# Patient Record
Sex: MALE | Race: WHITE | Employment: OTHER | URBAN - METROPOLITAN AREA
[De-identification: names, ages, dates, MRNs, and addresses within clinical notes are randomized per-mention and may not be internally consistent; named-entity substitution may affect disease eponyms.]

---

## 2017-01-01 ENCOUNTER — APPOINTMENT (OUTPATIENT)
Dept: GENERAL RADIOLOGY | Facility: HOSPITAL | Age: 72
DRG: 266 | End: 2017-01-01
Attending: NURSE PRACTITIONER
Payer: MEDICARE

## 2017-01-01 ENCOUNTER — HOSPITAL ENCOUNTER (OUTPATIENT)
Dept: GENERAL RADIOLOGY | Facility: HOSPITAL | Age: 72
Discharge: HOME | End: 2017-01-01
Attending: NURSE PRACTITIONER
Payer: MEDICARE

## 2017-01-01 ENCOUNTER — HOSPITAL ENCOUNTER (INPATIENT)
Facility: HOSPITAL | Age: 72
LOS: 2 days | Discharge: HOME | DRG: 266 | End: 2017-01-01
Attending: INTERNAL MEDICINE | Admitting: INTERNAL MEDICINE
Payer: MEDICARE

## 2017-01-01 ENCOUNTER — HOSPITAL ENCOUNTER (OUTPATIENT)
Dept: CT IMAGING | Facility: HOSPITAL | Age: 72
Discharge: HOME | End: 2017-01-01
Attending: PHYSICIAN ASSISTANT
Payer: MEDICARE

## 2017-01-01 ENCOUNTER — APPOINTMENT (OUTPATIENT)
Dept: CV DIAGNOSTICS | Facility: HOSPITAL | Age: 72
DRG: 266 | End: 2017-01-01
Attending: NURSE PRACTITIONER
Payer: MEDICARE

## 2017-01-01 ENCOUNTER — HOSPITAL ENCOUNTER (OUTPATIENT)
Dept: ULTRASOUND IMAGING | Facility: HOSPITAL | Age: 72
Discharge: HOME | End: 2017-01-01
Attending: PHYSICIAN ASSISTANT
Payer: MEDICARE

## 2017-01-01 ENCOUNTER — ANESTHESIA EVENT (OUTPATIENT)
Dept: CARDIAC SURGERY | Facility: HOSPITAL | Age: 72
End: 2017-01-01

## 2017-01-01 ENCOUNTER — SURGERY (OUTPATIENT)
Age: 72
End: 2017-01-01

## 2017-01-01 ENCOUNTER — ANESTHESIA (OUTPATIENT)
Dept: CARDIAC SURGERY | Facility: HOSPITAL | Age: 72
End: 2017-01-01

## 2017-01-01 ENCOUNTER — RT VISIT (OUTPATIENT)
Dept: RESPIRATORY THERAPY | Facility: HOSPITAL | Age: 72
End: 2017-01-01
Attending: PHYSICIAN ASSISTANT
Payer: MEDICARE

## 2017-01-01 VITALS
TEMPERATURE: 98 F | RESPIRATION RATE: 24 BRPM | BODY MASS INDEX: 33 KG/M2 | OXYGEN SATURATION: 97 % | HEART RATE: 88 BPM | SYSTOLIC BLOOD PRESSURE: 122 MMHG | WEIGHT: 255.75 LBS | DIASTOLIC BLOOD PRESSURE: 101 MMHG

## 2017-01-01 DIAGNOSIS — Z01.810 PRE-OPERATIVE CARDIOVASCULAR EXAMINATION: ICD-10-CM

## 2017-01-01 DIAGNOSIS — I35.0 SEVERE AORTIC STENOSIS: ICD-10-CM

## 2017-01-01 DIAGNOSIS — Z95.2 H/O AORTIC VALVE REPLACEMENT: ICD-10-CM

## 2017-01-01 PROCEDURE — 76937 US GUIDE VASCULAR ACCESS: CPT | Performed by: NURSE PRACTITIONER

## 2017-01-01 PROCEDURE — 71010 XR CHEST AP PORTABLE  (CPT=71010): CPT

## 2017-01-01 PROCEDURE — B24BZZ4 ULTRASONOGRAPHY OF HEART WITH AORTA, TRANSESOPHAGEAL: ICD-10-PCS | Performed by: INTERNAL MEDICINE

## 2017-01-01 PROCEDURE — 36569 INSJ PICC 5 YR+ W/O IMAGING: CPT | Performed by: NURSE PRACTITIONER

## 2017-01-01 PROCEDURE — 85610 PROTHROMBIN TIME: CPT | Performed by: NURSE PRACTITIONER

## 2017-01-01 PROCEDURE — 85025 COMPLETE CBC W/AUTO DIFF WBC: CPT | Performed by: NURSE PRACTITIONER

## 2017-01-01 PROCEDURE — 87081 CULTURE SCREEN ONLY: CPT | Performed by: INTERNAL MEDICINE

## 2017-01-01 PROCEDURE — 81003 URINALYSIS AUTO W/O SCOPE: CPT | Performed by: NURSE PRACTITIONER

## 2017-01-01 PROCEDURE — 97530 THERAPEUTIC ACTIVITIES: CPT

## 2017-01-01 PROCEDURE — 93880 EXTRACRANIAL BILAT STUDY: CPT

## 2017-01-01 PROCEDURE — 93325 DOPPLER ECHO COLOR FLOW MAPG: CPT

## 2017-01-01 PROCEDURE — 83735 ASSAY OF MAGNESIUM: CPT | Performed by: NURSE PRACTITIONER

## 2017-01-01 PROCEDURE — 80061 LIPID PANEL: CPT | Performed by: NURSE PRACTITIONER

## 2017-01-01 PROCEDURE — 80048 BASIC METABOLIC PNL TOTAL CA: CPT | Performed by: NURSE PRACTITIONER

## 2017-01-01 PROCEDURE — 87086 URINE CULTURE/COLONY COUNT: CPT | Performed by: FAMILY MEDICINE

## 2017-01-01 PROCEDURE — B547ZZA ULTRASONOGRAPHY OF LEFT SUBCLAVIAN VEIN, GUIDANCE: ICD-10-PCS | Performed by: INTERNAL MEDICINE

## 2017-01-01 PROCEDURE — 97166 OT EVAL MOD COMPLEX 45 MIN: CPT

## 2017-01-01 PROCEDURE — 84132 ASSAY OF SERUM POTASSIUM: CPT

## 2017-01-01 PROCEDURE — 94726 PLETHYSMOGRAPHY LUNG VOLUMES: CPT

## 2017-01-01 PROCEDURE — 85347 COAGULATION TIME ACTIVATED: CPT

## 2017-01-01 PROCEDURE — 93355 ECHO TRANSESOPHAGEAL (TEE): CPT

## 2017-01-01 PROCEDURE — 93010 ELECTROCARDIOGRAM REPORT: CPT | Performed by: INTERNAL MEDICINE

## 2017-01-01 PROCEDURE — 80053 COMPREHEN METABOLIC PANEL: CPT | Performed by: NURSE PRACTITIONER

## 2017-01-01 PROCEDURE — 87186 SC STD MICRODIL/AGAR DIL: CPT | Performed by: FAMILY MEDICINE

## 2017-01-01 PROCEDURE — 86920 COMPATIBILITY TEST SPIN: CPT

## 2017-01-01 PROCEDURE — 93306 TTE W/DOPPLER COMPLETE: CPT

## 2017-01-01 PROCEDURE — 84295 ASSAY OF SERUM SODIUM: CPT

## 2017-01-01 PROCEDURE — 05H633Z INSERTION OF INFUSION DEVICE INTO LEFT SUBCLAVIAN VEIN, PERCUTANEOUS APPROACH: ICD-10-PCS | Performed by: INTERNAL MEDICINE

## 2017-01-01 PROCEDURE — 81001 URINALYSIS AUTO W/SCOPE: CPT | Performed by: FAMILY MEDICINE

## 2017-01-01 PROCEDURE — 71020 XR CHEST PA + LAT CHEST (CPT=71020): CPT

## 2017-01-01 PROCEDURE — 94729 DIFFUSING CAPACITY: CPT

## 2017-01-01 PROCEDURE — 82330 ASSAY OF CALCIUM: CPT

## 2017-01-01 PROCEDURE — 71275 CT ANGIOGRAPHY CHEST: CPT

## 2017-01-01 PROCEDURE — 85730 THROMBOPLASTIN TIME PARTIAL: CPT | Performed by: NURSE PRACTITIONER

## 2017-01-01 PROCEDURE — 85730 THROMBOPLASTIN TIME PARTIAL: CPT | Performed by: INTERNAL MEDICINE

## 2017-01-01 PROCEDURE — 81003 URINALYSIS AUTO W/O SCOPE: CPT | Performed by: INTERNAL MEDICINE

## 2017-01-01 PROCEDURE — 02RF38Z REPLACEMENT OF AORTIC VALVE WITH ZOOPLASTIC TISSUE, PERCUTANEOUS APPROACH: ICD-10-PCS | Performed by: INTERNAL MEDICINE

## 2017-01-01 PROCEDURE — 94010 BREATHING CAPACITY TEST: CPT

## 2017-01-01 PROCEDURE — 83880 ASSAY OF NATRIURETIC PEPTIDE: CPT | Performed by: NURSE PRACTITIONER

## 2017-01-01 PROCEDURE — 87081 CULTURE SCREEN ONLY: CPT | Performed by: NURSE PRACTITIONER

## 2017-01-01 PROCEDURE — 83036 HEMOGLOBIN GLYCOSYLATED A1C: CPT | Performed by: NURSE PRACTITIONER

## 2017-01-01 PROCEDURE — 93005 ELECTROCARDIOGRAM TRACING: CPT

## 2017-01-01 PROCEDURE — 85027 COMPLETE CBC AUTOMATED: CPT | Performed by: NURSE PRACTITIONER

## 2017-01-01 PROCEDURE — 86901 BLOOD TYPING SEROLOGIC RH(D): CPT | Performed by: NURSE PRACTITIONER

## 2017-01-01 PROCEDURE — 93320 DOPPLER ECHO COMPLETE: CPT

## 2017-01-01 PROCEDURE — 75635 CT ANGIO ABDOMINAL ARTERIES: CPT

## 2017-01-01 PROCEDURE — 97162 PT EVAL MOD COMPLEX 30 MIN: CPT

## 2017-01-01 PROCEDURE — 87077 CULTURE AEROBIC IDENTIFY: CPT | Performed by: FAMILY MEDICINE

## 2017-01-01 PROCEDURE — B310YZZ FLUOROSCOPY OF THORACIC AORTA USING OTHER CONTRAST: ICD-10-PCS | Performed by: INTERNAL MEDICINE

## 2017-01-01 PROCEDURE — 86850 RBC ANTIBODY SCREEN: CPT | Performed by: NURSE PRACTITIONER

## 2017-01-01 PROCEDURE — 86900 BLOOD TYPING SEROLOGIC ABO: CPT | Performed by: NURSE PRACTITIONER

## 2017-01-01 PROCEDURE — 82803 BLOOD GASES ANY COMBINATION: CPT

## 2017-01-01 PROCEDURE — 85014 HEMATOCRIT: CPT

## 2017-01-01 PROCEDURE — 93306 TTE W/DOPPLER COMPLETE: CPT | Performed by: INTERNAL MEDICINE

## 2017-01-01 DEVICE — VALVE SAPIEN 23MM COMMANDER: Type: IMPLANTABLE DEVICE | Site: AORTA | Status: FUNCTIONAL

## 2017-01-01 RX ORDER — DIPHENHYDRAMINE HYDROCHLORIDE 50 MG/ML
50 INJECTION INTRAMUSCULAR; INTRAVENOUS ONCE
Status: DISCONTINUED | OUTPATIENT
Start: 2017-01-01 | End: 2017-01-01

## 2017-01-01 RX ORDER — HYDROCODONE BITARTRATE AND ACETAMINOPHEN 10; 325 MG/1; MG/1
1 TABLET ORAL EVERY 6 HOURS PRN
Status: DISCONTINUED | OUTPATIENT
Start: 2017-01-01 | End: 2017-01-01

## 2017-01-01 RX ORDER — SODIUM CHLORIDE 9 MG/ML
INJECTION, SOLUTION INTRAVENOUS CONTINUOUS
Status: DISCONTINUED | OUTPATIENT
Start: 2017-01-01 | End: 2017-01-01

## 2017-01-01 RX ORDER — CEFAZOLIN SODIUM 1 G/3ML
INJECTION, POWDER, FOR SOLUTION INTRAMUSCULAR; INTRAVENOUS
Status: DISCONTINUED | OUTPATIENT
Start: 2017-01-01 | End: 2017-01-01 | Stop reason: HOSPADM

## 2017-01-01 RX ORDER — NITROGLYCERIN 20 MG/100ML
INJECTION INTRAVENOUS CONTINUOUS PRN
Status: DISCONTINUED | OUTPATIENT
Start: 2017-01-01 | End: 2017-01-01

## 2017-01-01 RX ORDER — POTASSIUM CHLORIDE 20 MEQ/1
20 TABLET, EXTENDED RELEASE ORAL DAILY
Qty: 30 TABLET | Refills: 6 | Status: SHIPPED | OUTPATIENT
Start: 2017-01-01

## 2017-01-01 RX ORDER — MORPHINE SULFATE 4 MG/ML
8 INJECTION, SOLUTION INTRAMUSCULAR; INTRAVENOUS
Status: DISCONTINUED | OUTPATIENT
Start: 2017-01-01 | End: 2017-01-01

## 2017-01-01 RX ORDER — ONDANSETRON 2 MG/ML
4 INJECTION INTRAMUSCULAR; INTRAVENOUS EVERY 6 HOURS PRN
Status: DISCONTINUED | OUTPATIENT
Start: 2017-01-01 | End: 2017-01-01

## 2017-01-01 RX ORDER — HYDRALAZINE HYDROCHLORIDE 25 MG/1
25 TABLET, FILM COATED ORAL 3 TIMES DAILY
Status: DISCONTINUED | OUTPATIENT
Start: 2017-01-01 | End: 2017-01-01

## 2017-01-01 RX ORDER — MORPHINE SULFATE 2 MG/ML
2 INJECTION, SOLUTION INTRAMUSCULAR; INTRAVENOUS
Status: DISCONTINUED | OUTPATIENT
Start: 2017-01-01 | End: 2017-01-01

## 2017-01-01 RX ORDER — METOPROLOL SUCCINATE 25 MG/1
25 TABLET, EXTENDED RELEASE ORAL
Qty: 30 TABLET | Refills: 3 | Status: SHIPPED | OUTPATIENT
Start: 2017-01-01 | End: 2017-01-01

## 2017-01-01 RX ORDER — FAMOTIDINE 20 MG/1
20 TABLET ORAL 2 TIMES DAILY
Status: DISCONTINUED | OUTPATIENT
Start: 2017-01-01 | End: 2017-01-01

## 2017-01-01 RX ORDER — METOPROLOL SUCCINATE 25 MG/1
25 TABLET, EXTENDED RELEASE ORAL
Status: DISCONTINUED | OUTPATIENT
Start: 2017-01-01 | End: 2017-01-01

## 2017-01-01 RX ORDER — DIPHENHYDRAMINE HYDROCHLORIDE 50 MG/ML
50 INJECTION INTRAMUSCULAR; INTRAVENOUS ONCE
Status: COMPLETED | OUTPATIENT
Start: 2017-01-01 | End: 2017-01-01

## 2017-01-01 RX ORDER — LOSARTAN POTASSIUM 25 MG/1
25 TABLET ORAL DAILY
Qty: 30 TABLET | Refills: 3 | Status: SHIPPED | OUTPATIENT
Start: 2017-01-01 | End: 2017-01-01

## 2017-01-01 RX ORDER — DOBUTAMINE HYDROCHLORIDE 100 MG/100ML
INJECTION INTRAVENOUS CONTINUOUS PRN
Status: DISCONTINUED | OUTPATIENT
Start: 2017-01-01 | End: 2017-01-01

## 2017-01-01 RX ORDER — POLYETHYLENE GLYCOL 3350 17 G/17G
1 POWDER, FOR SOLUTION ORAL DAILY PRN
Status: DISCONTINUED | OUTPATIENT
Start: 2017-01-01 | End: 2017-01-01

## 2017-01-01 RX ORDER — ACETAMINOPHEN 325 MG/1
650 TABLET ORAL EVERY 4 HOURS PRN
Status: DISCONTINUED | OUTPATIENT
Start: 2017-01-01 | End: 2017-01-01

## 2017-01-01 RX ORDER — METHYLPREDNISOLONE SODIUM SUCCINATE 40 MG/ML
40 INJECTION, POWDER, LYOPHILIZED, FOR SOLUTION INTRAMUSCULAR; INTRAVENOUS ONCE
Status: COMPLETED | OUTPATIENT
Start: 2017-01-01 | End: 2017-01-01

## 2017-01-01 RX ORDER — ZOLPIDEM TARTRATE 5 MG/1
5 TABLET ORAL NIGHTLY PRN
Status: DISCONTINUED | OUTPATIENT
Start: 2017-01-01 | End: 2017-01-01

## 2017-01-01 RX ORDER — DOXEPIN HYDROCHLORIDE 50 MG/1
1 CAPSULE ORAL DAILY
Status: DISCONTINUED | OUTPATIENT
Start: 2017-01-01 | End: 2017-01-01

## 2017-01-01 RX ORDER — GABAPENTIN 300 MG/1
300 CAPSULE ORAL 3 TIMES DAILY
Status: DISCONTINUED | OUTPATIENT
Start: 2017-01-01 | End: 2017-01-01

## 2017-01-01 RX ORDER — ATORVASTATIN CALCIUM 10 MG/1
10 TABLET, FILM COATED ORAL NIGHTLY
Status: DISCONTINUED | OUTPATIENT
Start: 2017-01-01 | End: 2017-01-01

## 2017-01-01 RX ORDER — HYDROCODONE BITARTRATE AND ACETAMINOPHEN 10; 325 MG/1; MG/1
2 TABLET ORAL EVERY 6 HOURS PRN
Status: DISCONTINUED | OUTPATIENT
Start: 2017-01-01 | End: 2017-01-01

## 2017-01-01 RX ORDER — BISACODYL 10 MG
10 SUPPOSITORY, RECTAL RECTAL
Status: DISCONTINUED | OUTPATIENT
Start: 2017-01-01 | End: 2017-01-01

## 2017-01-01 RX ORDER — HYDRALAZINE HYDROCHLORIDE 20 MG/ML
20 INJECTION INTRAMUSCULAR; INTRAVENOUS EVERY 2 HOUR PRN
Status: DISCONTINUED | OUTPATIENT
Start: 2017-01-01 | End: 2017-01-01

## 2017-01-01 RX ORDER — METHYLPREDNISOLONE SODIUM SUCCINATE 40 MG/ML
40 INJECTION, POWDER, LYOPHILIZED, FOR SOLUTION INTRAMUSCULAR; INTRAVENOUS ONCE
Status: DISCONTINUED | OUTPATIENT
Start: 2017-01-01 | End: 2017-01-01

## 2017-01-01 RX ORDER — SODIUM CHLORIDE 0.9 % (FLUSH) 0.9 %
10 SYRINGE (ML) INJECTION EVERY 12 HOURS
Status: DISCONTINUED | OUTPATIENT
Start: 2017-01-01 | End: 2017-01-01

## 2017-01-01 RX ORDER — SOTALOL HYDROCHLORIDE 80 MG/1
80 TABLET ORAL 2 TIMES DAILY
Status: DISCONTINUED | OUTPATIENT
Start: 2017-01-01 | End: 2017-01-01

## 2017-01-01 RX ORDER — DOCUSATE SODIUM 100 MG/1
100 CAPSULE, LIQUID FILLED ORAL 2 TIMES DAILY
Status: DISCONTINUED | OUTPATIENT
Start: 2017-01-01 | End: 2017-01-01

## 2017-01-01 RX ORDER — ATORVASTATIN CALCIUM 10 MG/1
10 TABLET, FILM COATED ORAL NIGHTLY
Qty: 30 TABLET | Refills: 3 | Status: SHIPPED | OUTPATIENT
Start: 2017-01-01 | End: 2017-01-01

## 2017-01-01 RX ORDER — LACTULOSE 20 G/30ML
20 SOLUTION ORAL DAILY PRN
Status: DISCONTINUED | OUTPATIENT
Start: 2017-01-01 | End: 2017-01-01

## 2017-01-01 RX ORDER — SOTALOL HYDROCHLORIDE 80 MG/1
80 TABLET ORAL
Status: DISCONTINUED | OUTPATIENT
Start: 2017-01-01 | End: 2017-01-01

## 2017-01-01 RX ORDER — FUROSEMIDE 40 MG/1
40 TABLET ORAL
Status: DISCONTINUED | OUTPATIENT
Start: 2017-01-01 | End: 2017-01-01

## 2017-01-01 RX ORDER — HEPARIN SODIUM AND DEXTROSE 10000; 5 [USP'U]/100ML; G/100ML
INJECTION INTRAVENOUS CONTINUOUS
Status: DISCONTINUED | OUTPATIENT
Start: 2017-01-01 | End: 2017-01-01

## 2017-01-01 RX ORDER — ALBUMIN, HUMAN INJ 5% 5 %
250 SOLUTION INTRAVENOUS ONCE AS NEEDED
Status: ACTIVE | OUTPATIENT
Start: 2017-01-01 | End: 2017-01-01

## 2017-01-01 RX ORDER — CLOPIDOGREL BISULFATE 75 MG/1
75 TABLET ORAL DAILY
Status: DISCONTINUED | OUTPATIENT
Start: 2017-01-01 | End: 2017-01-01

## 2017-01-01 RX ORDER — HEPARIN SODIUM AND DEXTROSE 10000; 5 [USP'U]/100ML; G/100ML
1000 INJECTION INTRAVENOUS ONCE
Status: COMPLETED | OUTPATIENT
Start: 2017-01-01 | End: 2017-01-01

## 2017-01-01 RX ORDER — HEPARIN SODIUM 5000 [USP'U]/ML
5000 INJECTION INTRAVENOUS; SUBCUTANEOUS ONCE
Status: COMPLETED | OUTPATIENT
Start: 2017-01-01 | End: 2017-01-01

## 2017-01-01 RX ORDER — CHLORHEXIDINE GLUCONATE 4 G/100ML
30 SOLUTION TOPICAL ONCE
Status: COMPLETED | OUTPATIENT
Start: 2017-01-01 | End: 2017-01-01

## 2017-01-01 RX ORDER — LOSARTAN POTASSIUM 25 MG/1
25 TABLET ORAL DAILY
Status: DISCONTINUED | OUTPATIENT
Start: 2017-01-01 | End: 2017-01-01

## 2017-01-01 RX ORDER — FAMOTIDINE 10 MG/ML
20 INJECTION, SOLUTION INTRAVENOUS 2 TIMES DAILY
Status: DISCONTINUED | OUTPATIENT
Start: 2017-01-01 | End: 2017-01-01

## 2017-01-01 RX ORDER — CARVEDILOL 3.12 MG/1
3.12 TABLET ORAL 2 TIMES DAILY WITH MEALS
Status: DISCONTINUED | OUTPATIENT
Start: 2017-01-01 | End: 2017-01-01

## 2017-01-01 RX ORDER — MORPHINE SULFATE 4 MG/ML
4 INJECTION, SOLUTION INTRAMUSCULAR; INTRAVENOUS
Status: DISCONTINUED | OUTPATIENT
Start: 2017-01-01 | End: 2017-01-01

## 2017-01-17 PROBLEM — K21.9 GASTROESOPHAGEAL REFLUX DISEASE WITHOUT ESOPHAGITIS: Status: ACTIVE | Noted: 2017-01-01

## 2017-02-14 PROBLEM — Z95.2 HISTORY OF AORTIC VALVE REPLACEMENT: Status: ACTIVE | Noted: 2017-01-01

## 2017-03-11 NOTE — PROCEDURES
[facility] is a 15-year-old  male who stands 6 feet 2 inches tall and weighs 257 pounds. He underwent standard pulmonary function testing on 3/10/17.   He carries a diagnosis of aortic stenosis on a background of bioprosthetic aortic scar

## 2017-03-13 NOTE — PROGRESS NOTES
Quick Note:    Pre-TAVR work up. Measurements consistent with a 20 mm S3 or 23 mm Evolut R valve. No calcium in LVOT. Will review images with Natalie Nguyen.  ______

## 2017-03-13 NOTE — PROGRESS NOTES
Quick Note:    Pre-TAVR work up. No significant obstructive or restrictive disease noted.  CPM.  ______

## 2017-03-13 NOTE — PROGRESS NOTES
Quick Note:    Pre-TAVR work up. No significant atherosclerosis noted. Will review images with Natalie Pineda and [employer]. Non-obstructive kidney stone also noted.   ______

## 2017-03-14 NOTE — PROGRESS NOTES
Quick Note:    Possible pulmonary nodule noted. Will refer patient to pulmonary for further evaluation.   ______

## 2017-03-22 PROBLEM — R91.1 LUNG NODULE: Status: ACTIVE | Noted: 2017-01-01

## 2017-03-25 PROBLEM — I25.10 CORONARY ARTERY DISEASE INVOLVING NATIVE CORONARY ARTERY OF NATIVE HEART WITHOUT ANGINA PECTORIS: Status: ACTIVE | Noted: 2017-01-01

## 2017-04-03 PROBLEM — I35.0 AORTIC STENOSIS, SEVERE: Status: ACTIVE | Noted: 2017-01-01

## 2017-04-03 NOTE — CONSULTS
Cardiothoracic Surgery  TAVR Consult    2nd Opinion TAVR Consult  Natalie Nichols  Referring: Dr Hetal Powell  66year old with symptomatic aortic stenosis  PMH: CRI, Barretts esophagus, HTN  PSH: AVR 27 mm medtronic mosaic, hernia, camelia   Meds: see chart arteries demonstrate antegrade flow.      3/10/2017  CONCLUSION: Minimal atheromatous plaque at the bifurcations with 0-50% stenoses within the internal carotid arteries bilaterally.    Dictated by: Julian Kolb MD on 3/10/2017 at 9:36     Approved by: and 2 mm left midpole kidney stones. The right kidney is unremarkable. There is no hydronephrosis or hydroureter. .  ADRENALS:  Normal.  No mass or enlargement.  AORTA/VASCULAR:  Normal.  No aneurysm or dissection.  RETROPERITONEUM:  Normal.  No mass or rayna on 3/10/2017 at 11:53     Approved by:  Saurav Ferguson MD            Xr Chest Ap Portable  (cpt=71010)    4/3/2017  PROCEDURE:  XR CHEST AP PORTABLE (CPT=71010)  TECHNIQUE:  AP chest radiograph was obtained.  COMPARISON:  None.  INDICATIONS:  preop TVAR  PATIE circulation. The heart rate at the time of   contrast injection was 80 beats per minute. The cardiac rhythm was atrial fibrillation.  The images reveal <<normal anatomic relationships  between the atria, ventricles, and great vessels.    Image quality was f is 52.1 degrees. The ascending aorta is moderately enlarged (46.2 mm in maximal diameter at the level of the pulmonary artery bifurcation).  AORTIC ARCH:   Three vessels arise from the arch: brachiocephalic trunk, left common carotid artery, and left subcla mm. The ascending aorta is moderately enlarged (46.2 mm in maximal diameter at the level of the pulmonary artery bifurcation). 6. The pericardial thickness at the surgical apex measures less than one mm in axial views.  7. Please refer to the radiologist's images reveal <<normal anatomic relationships  between the atria, ventricles, and great vessels.    Image quality was fair.  No artifact was noted.  PATIENT STATED HISTORY:  Pre-op for TAVR procedure.   CONTRAST USED:  100 cc of Omnipaque 350     NON-CONTRA AORTIC ARCH:   Three vessels arise from the arch: brachiocephalic trunk, left common carotid artery, and left subclavian artery. The aortic arch is normal in size. DESCENDING AORTA:   The descending thoracic aorta is normal in size.   LM:  The left main cor pericardial thickness at the surgical apex measures less than one mm in axial views.  7. Please refer to the radiologist's interpretation of non-cardiac structures Approved by: Amilcar Khalil MD    .            A/P:  66year old with symptomatic aort

## 2017-04-03 NOTE — ANESTHESIA PREPROCEDURE EVALUATION
PRE-OP EVALUATION    Patient Name: Stew Paredes    Pre-op Diagnosis: aortic stenosis    Procedure(s):  transcatheter aortic valve replacement, right femoral approach, 23mm,    percutaneous    Surgeon(s) and Role:     * Fay Rodriguez MD - Primary GI/hepatic/renal ROS. Cardiovascular  Comment: Conclusions    Summary:    1. Left ventricle: The cavity size is moderately increased. There is mild     concentric hypertrophy.  Systolic function is moderately to markedly     redu PTCA mid to distal LAD two Xience Stent, mild to moderat pulmonary hypertenion, consider TAVR in near future stents x 2 total     VALVE REPAIR      Comment Aortic Valve Mosaic Ultra Porcine Heart Valve 2010 #305U27 Model    OTHER SURGICAL HISTORY      Comm IV access, poss post op vent discussed  Plan/risks discussed with: patient  Use of blood product(s) discussed with: patient              Present on Admission:   **None**

## 2017-04-03 NOTE — CM/SW NOTE
SW met with patient pre-operatively to assess for discharge needs. Patient is scheduled for a TAVR tomorrow (4/4). Patient states he lives with his wife in a two story home. Their home has 30 stairs.   Patient states he was independent in ADL's prior to

## 2017-04-03 NOTE — PROGRESS NOTES
[facility]  Progress Note    Janiya Spencer Patient Status:  Inpatient    4/10/1945 MRN [id number]   [facility] 8NE-A Attending Shana Lesches, MD   Hosp Day # 0 PCP Hina Aguilar MD     The patient was s vomiting)      •  High blood pressure                Past Surgical History      HERNIA SURGERY          CHOLECYSTECTOMY          HC IMPLANT HEART VALVE          Comment  replacement      KNEE SURGERY  Right        COLONOSCOPY          OTHER  Left        Co daily.  Disp:   Rfl:     Multiple Vitamin (MULTI VITAMIN DAILY) Oral Tab  Take 1 tablet by mouth daily.  Disp:   Rfl:         Allergies:     Iodides [Radiology *         Comment:HOT AND FLUSHED  Meloxicam                    Comment:BLEEDING ULCER  Milk-Rela (hcc)  Essential hypertension  Review of previous echocardiograms reveals that the patient has severe bioprosthetic aortic valve stenosis which is becoming very symptomatic on an accelerated basis.  At this point I feel the patient has NYHA class 3-4 sympto

## 2017-04-03 NOTE — H&P
[facility] Hospitalist History and Physical      No chief complaint on file. PCP: Oliver Rios MD      History of Present Illness: Patient is a 66year old male with PMH sig for HTN, HL, AS, and chronic afib who presents to [facility] for elective tavr.  He states ok       No current outpatient prescriptions on file.        Smoking status: Never Smoker     Smokeless tobacco: Never Used    Alcohol Use: Yes  0.0 oz/week    0 Standard drinks or equivalent per week         Comment: EBEN Isidro Hx  Family History   P appreciated     Data Review:    LABS:     Lab Results  Component Value Date   WBC 5.3 04/03/2017   HGB 14.8 04/03/2017   HCT 42.0 04/03/2017   .0 04/03/2017   CREATSERUM 1.05 04/03/2017   BUN 18 04/03/2017    04/03/2017   K 4.0 04/03/2017   CL 42.15 cm/s Left Carotid Bulb Peak Systolic Velocity  96.53 cm/s Left ICA/CCA Velocity Ratio:  0.68  The vertebral arteries demonstrate antegrade flow.       3/10/2017  CONCLUSION: Minimal atheromatous plaque at the bifurcations with 0-50% stenoses within th ductal dilatation, or atrophy. SPLEEN:  Normal.  No enlargement or focal lesion. KIDNEYS:  Nonobstructing 3 mm and 2 mm left midpole kidney stones. The right kidney is unremarkable. There is no hydronephrosis or hydroureter. .  ADRENALS:  Normal.  No mass Mild diverticulosis of the sigmoid colon. 3. Non-obstructing small left kidney stones. Dictated by: Saurav Ferguson MD on 3/10/2017 at 11:53     Approved by:  Saurav Ferguson MD            Xr Chest Ap Portable  (cpt=71010)    4/3/2017  PROCEDURE:  XR CHEST AP PO Registry) which includes the Dose Index Registry. Gated contrast images of the chest were timed for the systemic arterial circulation. The heart rate at the time of   contrast injection was 80 beats per minute. The cardiac rhythm was atrial fibrillation. ascending aorta measures 73.6 mm in length from the aortic valve annulus to its greater curvature. The aortoventricular angle is 52.1 degrees.  The ascending aorta is moderately enlarged (46.2 mm in maximal diameter at the level of the pulmonary artery bifu tract. 5. The sinuses of Valsalva have an average diameter of 41.4 mm. The sinotubular junction has an average diameter of 40.4 mm. The ascending aorta is moderately enlarged (46.2 mm in maximal diameter at the level of the pulmonary artery bifurcation).  6 The heart rate at the time of   contrast injection was 80 beats per minute. The cardiac rhythm was atrial fibrillation. The images reveal <<normal anatomic relationships  between the atria, ventricles, and great vessels. Image quality was fair.   No marc degrees. The ascending aorta is moderately enlarged (46.2 mm in maximal diameter at the level of the pulmonary artery bifurcation).  AORTIC ARCH:   Three vessels arise from the arch: brachiocephalic trunk, left common carotid artery, and left subclavian art ascending aorta is moderately enlarged (46.2 mm in maximal diameter at the level of the pulmonary artery bifurcation). 6. The pericardial thickness at the surgical apex measures less than one mm in axial views.  7. Please refer to the radiologist's interpre

## 2017-04-04 NOTE — ANESTHESIA POSTPROCEDURE EVALUATION
[address] Patient Status:  Inpatient   Age/Gender 66year old male MRN [id number]   [facility] 6NE-A Attending Jimbo Vega, [address] [facility] Day # 1 PCP Tonio Walsh MD       Anesthesia Post-op Note    Procedure

## 2017-04-04 NOTE — OPERATIVE REPORT
[facility] [place]    PATIENT'S NAME: [facility]   ATTENDING PHYSICIAN: Travon Jones. Daisha Pearce DO   OPERATING PHYSICIAN: Hayley De León M.D.    PATIENT ACCOUNT#:   [de-identified]    LOCATION:  [address] [place] [address]  MEDICAL RECORD #:   [id number]       DATE OF deployed with no perivalvular leak postprocedure.     Dictated By Uziel Stewart M.D.  d: 04/04/2017 11:02:24  t: 04/04/2017 14:24:13  [facility] [id number]  PMK/

## 2017-04-04 NOTE — PROGRESS NOTES
[facility] Hospitalist Progress Note                                                                   Kumar 84  4/10/1945    SUBJECTIVE: pt is sleepy.  Denies chest pain, sob and naus HF  -2/2 above, bnp elevated, now s/p tavr    #HTN  -cont bp meds, changes per cards    #Chronic afib  -cont sotolol, on heparin gtt    PPX: kemi Stone  [facility] Hospitalist  [phone]

## 2017-04-04 NOTE — OPERATIVE REPORT
Cardiovascular Surgery Operative Note  TAVR        INDICATIONS:          Pt seen by Joselin Brown  and myself  66year old with symptomatic aortic stenosis  Pt seen in TAVR clinic by Natalie Guadarrama  STS Risk Score:  2.9%     Eurosco stable condition    I was present for the entire procedure.

## 2017-04-04 NOTE — PLAN OF CARE
Received patient from [facility] 23 s/p TAVR femoral approach @ 1111. Patient is extubated on simple facemask. Patient is drowsy, easily awakened. CORADO, follows simple commands. Patient denies any pain. Bilateral groins soft, CDI, no oozing. +1 pedal pulses.  Dobutam

## 2017-04-04 NOTE — PROGRESS NOTES
[facility] Cardiology  Progress Note    Cristiano Esparza Patient Status:  Inpatient    4/10/1945 MRN [id number]   [facility] 6NE-A Attending Jeronimo Rodriguez, [address] [facility] Day # 1 PCP Tenzin Trujillo MD     Impression:  1.  Sever 250 mL 250 mL Intravenous Once PRN   DOBUTamine in D5W (DOBUTREX) 250 mg/250 ml infusion 2.5-10 mcg/kg/min Intravenous Continuous PRN   norepinephrine (LEVOPHED) 4 mg/250 ml premix infusion 0.5-8 mcg/min Intravenous Continuous PRN   EPINEPHrine HCl (ADRENA tablet 1 tablet Oral Daily   Clopidogrel Bisulfate (PLAVIX) tab 75 mg 75 mg Oral Daily   [MAR Hold] furosemide (LASIX) tab 40 mg 40 mg Oral BID (Diuretic)   [MAR Hold] hydrALAzine HCl (APRESOLINE) tab 25 mg 25 mg Oral TID   heparin (PORCINE) drip [id number]

## 2017-04-04 NOTE — PROCEDURES
[facility] [address] [place] [address] Cardiology  Transesophageal Echocardiogram Report    PREOPERATIVE DIAGNOSIS:   Severe aortic stenosis    POSTOPERATIVE DIAGNOSIS:  Transcatheter aortic valve replacement    PROCEDURE PERFORMED: Transesophageal echocardiogram with Doppl severe aortic stenosis, mean gradient 17 mmHg. Postprocedure, normally functioning Neal 3 bioprosthetic valve with a mean gradient of 19 mmHg. No paravalvular aortic regurgitation (by VARC-2 criteria) was noted.  There is mild constraint of the TAVR valv

## 2017-04-04 NOTE — PLAN OF CARE
Patient a&ox4. RA. Afib on tele. Denies any pain. Heparin drip infusing. Plan for TAVR tomorrow. Consents signed. NPO after midnight. Groins shaved. Pt shower tonight and in am. Heparin drip to be stopped at 0400. Daily weight.  Patient updated on Gloria James

## 2017-04-04 NOTE — PROCEDURES
PROCEDURE PERFORMED: Transcatheter aortic valve replacement, 23 mm Norton Neal 3 aortic valve, right  transfemoral approach percutaneously. OPERATORS:   1. Cardiovascular surgeon, Dr. Gely Shaw.   2. Interventional Cardiology, Ricco Lerma

## 2017-04-05 NOTE — PROGRESS NOTES
Pt awake and alert. tanvir groin dressings. right side with old drainage. Pt walked in halls tolerated well. Plan of care reviewed. Discharge home today.

## 2017-04-05 NOTE — CM/SW NOTE
SW received order for s/p TAVR. SW met w/ pt to discuss dc planning. PT=home. Pt reports that he is doing well and anticipates that he will have no needs for dc. SW/CM to remain available for support and/or discharge planning.      Miryam Ragsdale MSW Intern

## 2017-04-05 NOTE — PROGRESS NOTES
[facility] Cardiology   Progress Note    Mike Oleary Patient Status:  Inpatient    4/10/1945 MRN [id number]   [facility] 6NE-A Attending Charline Palmer, [address] [facility] [address] Day # 2 PCP Michael Dunbar MD     ADDENDUM:  The brandin Daily Beta Blocker   • multivitamin  1 tablet Oral Daily   • Clopidogrel Bisulfate  75 mg Oral Daily       Continuous Infusions:  • sodium chloride 10 mL/hr at 04/04/17 1238   • DOBUTamine in D5W Stopped (04/04/17 1230)   • norepinephrine     • EPINEPHrine range of motion. The sewing ring appears normal,  has no rocking motion, and shows no evidence of dehiscence. mean  transvalvular aortic gradient is 23. No previous echo is available. Peak  velocity (S): 3.2m/sec. Mean gradient (S): 23mm Hg.  Peak gradient 26mm Neal 3 valve on 4/4/2017; tolerated procedure; started on Eliquis and Plavix  2. CAD s/p PCI of LAD 3/15/2017 with Xience KENNA by Dr. Regino James; on Plavix   3. Acute on chronic systolic HF due to AS; now compensated after TAVR  4.  Iodine allergy-janki

## 2017-04-05 NOTE — DISCHARGE SUMMARY
General Medicine Discharge Summary     Patient ID:  Shawna Horan  66year old  4/10/1945    Admit date: 4/3/2017    Discharge date and time: 4/5/17    Attending Physician: Guss Kawasaki, DO REPLACEMENT (N/A)       Patient instructions:      Current Discharge Medication List    CONTINUE these medications which have NOT CHANGED    furosemide 40 MG Oral Tab  40mg bid    Clopidogrel Bisulfate 75 MG Oral Tab  Take 75 mg by mouth daily.     aspirin

## 2017-04-05 NOTE — OCCUPATIONAL THERAPY NOTE
OCCUPATIONAL THERAPY QUICK EVALUATION - INPATIENT    Room Number: 5130/8961-F  Evaluation Date: 4/5/2017     Type of Evaluation: Initial  Presenting Problem: TAVR    Physician Order: IP Consult to Occupational Therapy  Reason for Therapy:  ADL/IADL Dysfunc toilet  Shower/Tub and Equipment: Tub-shower combo  Other Equipment: None    Occupation/Status: retired  Hand Dominance: Right  Drives: Yes  Patient Regularly Uses: Glasses    Prior Level of [place]: Pt was independent and volunteering with [employer] reaching and functional mobility safely, without loss of balance, and at supervision level or above. Patient reports no further questions or concerns about safe return to I/ADL tasks. No further OT services needed at this time.      OT Discharge Recommendat

## 2017-04-05 NOTE — PLAN OF CARE
CARDIOVASCULAR - ADULT    • Maintains optimal cardiac output and hemodynamic stability Progressing        Patient/Family Goals    • Patient/Family Short Term Goal Progressing        S/P TAVR, alert/oriented x 4. Breathing comfortable on room air.  Denies dy

## 2017-04-05 NOTE — PHYSICAL THERAPY NOTE
PHYSICAL THERAPY QUICK EVALUATION - INPATIENT    Room Number: 0380/3106-D  Evaluation Date: 4/5/2017  Presenting Problem: s/p TAVR 4/4/17  Physician Order: PT Eval and Treat    Problem List  Active Problems:     Aortic stenosis, severe      Past Medical His ADLs and mobility. Pt does not use AD. Pt volunteers at [employer]. SUBJECTIVE  \"My breathing is much better. \"     OBJECTIVE  Precautions: Cardiac  Fall Risk: Standard fall risk    WEIGHT BEARING RESTRICTION  Weight Bearing Restriction: None conservation with stair mobility. Pt requires no rest break during ambulation or stair mobility, which patients reports is am improvement from baseline. Pt returned to room sitting up in bedside chair with call light in reach. RN aware.      Patient End of

## 2017-04-10 PROBLEM — Z95.2 S/P TAVR (TRANSCATHETER AORTIC VALVE REPLACEMENT): Status: ACTIVE | Noted: 2017-01-01

## 2017-04-12 PROBLEM — I50.22 CHRONIC SYSTOLIC HF (HEART FAILURE) (HCC): Status: ACTIVE | Noted: 2017-01-01

## 2017-04-12 PROBLEM — I25.10 CAD IN NATIVE ARTERY: Status: ACTIVE | Noted: 2017-01-01

## 2017-04-26 PROBLEM — M25.472 EDEMA OF LEFT ANKLE: Status: ACTIVE | Noted: 2017-01-01

## 2017-04-26 PROBLEM — S96.912D: Status: ACTIVE | Noted: 2017-01-01

## 2017-04-26 PROBLEM — M67.80 TENDON WEAKNESS: Status: ACTIVE | Noted: 2017-01-01

## 2017-05-23 PROBLEM — I50.22 CHRONIC SYSTOLIC CONGESTIVE HEART FAILURE (HCC): Status: ACTIVE | Noted: 2017-01-01

## 2017-05-23 PROBLEM — T14.8XXA HEMATOMA: Status: ACTIVE | Noted: 2017-01-01

## 2017-05-23 PROBLEM — I63.311 CEREBROVASCULAR ACCIDENT (CVA) DUE TO THROMBOSIS OF RIGHT MIDDLE CEREBRAL ARTERY (HCC): Status: ACTIVE | Noted: 2017-01-01

## 2017-06-06 PROBLEM — I69.30 HISTORY OF CVA WITH RESIDUAL DEFICIT: Status: ACTIVE | Noted: 2017-01-01

## 2017-06-06 PROBLEM — R29.898 LEFT LEG WEAKNESS: Status: ACTIVE | Noted: 2017-01-01

## 2017-06-06 PROBLEM — I35.0 AORTIC STENOSIS, SEVERE: Status: RESOLVED | Noted: 2017-01-01 | Resolved: 2017-01-01

## 2021-11-30 NOTE — CM/SW NOTE
04/07/17 0900   Discharge disposition   Discharged to: Home or Self PAST MEDICAL HISTORY:  Diabetes mellitus, type 2     Hypertension

## (undated) DEVICE — GLOVE SURG TRIUMPH SZ 8

## (undated) DEVICE — STERILE POLYISOPRENE POWDER-FREE SURGICAL GLOVES: Brand: PROTEXIS

## (undated) DEVICE — SOL LACT RINGERS 1000ML

## (undated) DEVICE — TRANSPOSAL ULTRAFLEX DUO/QUAD ULTRA CART MANIFOLD

## (undated) DEVICE — SUTURE PROLENE 2-0 SH

## (undated) DEVICE — CELL SAVER 5/5+ BOWL KIT-225ML: Brand: HAEMONETICS CELL SAVER 5/5+ SYSTEMS

## (undated) DEVICE — CELL SAVER BAG 600ML 4R2023

## (undated) DEVICE — PAD ELECTRODE MULTIRADIO ADLT

## (undated) DEVICE — GAUZE SPONGES,12 PLY: Brand: CURITY

## (undated) DEVICE — CLAMP INSERT: Brand: STEALTH® CLAMP INSERT

## (undated) DEVICE — CELL SAVER RESERVOIR BRAT

## (undated) DEVICE — TAPE UMBILICAL U11T

## (undated) DEVICE — BLADE STERNAL SAW BULK PACK

## (undated) DEVICE — TIBURON DRAPE TOWELS: Brand: CONVERTORS

## (undated) DEVICE — DRAPE HALF 40X58 DYNJP2410

## (undated) DEVICE — GRAFT PATCH FELT 1X6

## (undated) DEVICE — DRAPE C-ARM UNIVERSAL

## (undated) DEVICE — OPEN HEART: Brand: MEDLINE INDUSTRIES, INC.

## (undated) DEVICE — FLOSEAL SEALENT STERILE 10ML

## (undated) NOTE — LETTER
[facility]  [address] [facility], [address] [place] [address]    Consent for Operation    Date: _______4/4/2017_______    Time: _______________    1.  I authorize the performance upon Bhupinder Carty the following operation:    Procedure(s): transcathe revealed by the pictures or by descriptive texts accompanying them. If the procedure has been videotaped, the surgeon will obtain the original videotape. The hospital will not be responsible for storage or maintenance of this tape.     6. For the purpose of THAT MY DOCTOR PROVIDED ME WITH THE ABOVE EXPLANATIONS, THAT ALL BLANKS OR STATEMENTS REQUIRING INSERTION OR COMPLETION WERE FILLED IN.     Signature of Patient:   ___________________________    When the patient is a minor or mentally incompetent to give co · All of the medicines I take (including prescriptions, herbal supplements, and pills I can buy without a prescription (including street drugs/illegal medications).  Failure to inform my anesthesiologist about these medicines may increase my risk of anesthe _____________________________________________________________________________  Anesthesiologist Signature     Date   Time  I have discussed the procedure and information above with the patient (or patient’s representative) and answered their questions.  The

## (undated) NOTE — LETTER
[facility] [address], [address] [facility]    Consent for Anesthesia   1.    Tia Aver agree to be cared for by an anesthesiologist, who is specially trained to monitor me and give me medicine to put me to sleep or keep me c vision, nerves, or muscles and in extremely rare instances death. 5. My doctor has explained to me other choices available to me for my care (alternatives).   6. Pregnant Patients (“epidural”):  I understand that the risks of having an epidural (medicine g

## (undated) NOTE — LETTER
[facility]  [address] [facility], [address] [place] [address]    Consent for Operation    Date: __________________    Time: _______________    1.  I authorize the performance upon Claudene Mannheim the following operation:    Procedure(s):  Transcatheter revealed by the pictures or by descriptive texts accompanying them. If the procedure has been videotaped, the surgeon will obtain the original videotape. The hospital will not be responsible for storage or maintenance of this tape.     6. For the purpose of THAT MY DOCTOR PROVIDED ME WITH THE ABOVE EXPLANATIONS, THAT ALL BLANKS OR STATEMENTS REQUIRING INSERTION OR COMPLETION WERE FILLED IN.     Signature of Patient:   ___________________________    When the patient is a minor or mentally incompetent to give co · All of the medicines I take (including prescriptions, herbal supplements, and pills I can buy without a prescription (including street drugs/illegal medications).  Failure to inform my anesthesiologist about these medicines may increase my risk of anesthe _____________________________________________________________________________  Anesthesiologist Signature     Date   Time  I have discussed the procedure and information above with the patient (or patient’s representative) and answered their questions.  The

## (undated) NOTE — LETTER
[facility]  [address] [facility], [address] [place] [address]    Consent for Operation    Date: __________________    Time: _______________    1.  I authorize the performance upon King Carrasco the following operation:    Procedure(s):  transcatheter revealed by the pictures or by descriptive texts accompanying them. If the procedure has been videotaped, the surgeon will obtain the original videotape. The hospital will not be responsible for storage or maintenance of this tape.     6. For the purpose of THAT MY DOCTOR PROVIDED ME WITH THE ABOVE EXPLANATIONS, THAT ALL BLANKS OR STATEMENTS REQUIRING INSERTION OR COMPLETION WERE FILLED IN.     Signature of Patient:   ___________________________    When the patient is a minor or mentally incompetent to give co · All of the medicines I take (including prescriptions, herbal supplements, and pills I can buy without a prescription (including street drugs/illegal medications).  Failure to inform my anesthesiologist about these medicines may increase my risk of anesthe _____________________________________________________________________________  Anesthesiologist Signature     Date   Time  I have discussed the procedure and information above with the patient (or patient’s representative) and answered their questions.  The

## (undated) NOTE — IP AVS SNAPSHOT
[facility] [place] [address] [place], [address] ~ [phone]                Discharge Summary   4/3/2017    Janiya Spencer           Admission Information        Provider Department    4/3/2017 TIN PICHARDO DO Eh 6ne-A CHANGE how you take these medications        Instructions Authorizing Provider    Morning Afternoon Evening As Needed    Potassium Chloride ER 20 MEQ Tbcr   Commonly known as:  KLOR-CON M20   What changed:  when to take this        Take 1 tablet (20 mEq to hydrALAzine HCl 25 MG Tabs   Commonly known as:  APRESOLINE           Sotalol HCl 80 MG Tabs   Commonly known as:  BETAPACE                Where to Get Your Medications      These medications were sent to [facility]  [address] [place] [id number] for more than one day. · Drainage, tenderness, redness, swelling, persistent pain from the incisions, or new numbness in the legs or feet. · Persistent chest discomfort or pain (angina) that radiates to the neck, jaw, or arm.    · Nausea or profuse sweati Discharge References/Attachments     TRANSCATHETER AORTIC VALVE REPLACEMENT (TAVR), AFTER YOUR (ENGLISH)    CARDIAC CATHETERIZATION, BLEEDING OR HEMATOMA AFTER (ENGLISH)    LOSARTAN TABLETS (ENGLISH)    ATORVASTATIN TABLETS (ENGLISH)    APIXABAN ORAL TABLE 10.5 (04/05/17)  4.71 (04/05/17)  13.1 (04/05/17)  36.6 (L) (04/05/17)  77.7 (L) -- -- -- (04/05/17)  137.0 (L) (03/06/17)  11.9    (04/04/17)  6.9 (04/04/17)  5.09 (04/04/17)  14.1 (04/04/17)  39.4 (04/04/17)  77.4 (L)    (04/04/17)  135.0 (L) (01/12/17) - If you have concerns related to behavioral health issues or thoughts of harming yourself, contact [facility] at [phone].     - If you don’t have insurance, Luke Murillo has partnered with Patient Clementon Oliverio Losartan Potassium 25 MG Oral Tab         Use: Treat abnormal blood pressure (high or low), cardiac conditions; and/or abnormal heart rates/rhythms   Most common side effects: Dizziness or feeling lightheaded (especially with standing), heart rate changes movement in 2+ days, diarrhea           General Nerve Function Medications     gabapentin 300 MG Oral Cap       Use:  Treat conditions such as seizures, headaches, depression, anxiety and other neurologic conditions   Most common side effects:  Dizziness,